# Patient Record
Sex: MALE | Race: WHITE | Employment: OTHER | URBAN - METROPOLITAN AREA
[De-identification: names, ages, dates, MRNs, and addresses within clinical notes are randomized per-mention and may not be internally consistent; named-entity substitution may affect disease eponyms.]

---

## 2023-02-17 DIAGNOSIS — M25.551 RIGHT HIP PAIN: Primary | ICD-10-CM

## 2023-02-21 ENCOUNTER — OFFICE VISIT (OUTPATIENT)
Dept: ORTHOPEDIC SURGERY | Age: 68
End: 2023-02-21

## 2023-02-21 VITALS — BODY MASS INDEX: 32.25 KG/M2 | WEIGHT: 212.8 LBS | HEIGHT: 68 IN

## 2023-02-21 DIAGNOSIS — M25.561 PAIN IN BOTH KNEES, UNSPECIFIED CHRONICITY: ICD-10-CM

## 2023-02-21 DIAGNOSIS — M25.551 RIGHT HIP PAIN: Primary | ICD-10-CM

## 2023-02-21 DIAGNOSIS — M25.562 PAIN IN BOTH KNEES, UNSPECIFIED CHRONICITY: ICD-10-CM

## 2023-02-21 NOTE — PROGRESS NOTES
Patient ID:  Roland Barrow  [id number]  26 y.o.  1955    Today: February 21, 2023          Chief Complaint: Bilateral Knee pain    HPI:       Roland Barrow is a 74 y.o. male  that presents today for evaluation and treatment of bilateral knee pain. Patient reports onset of pain some time ago ago. The patient complains of knee pain with activities, reports stiffness of the knee with prolonged inactivity, and swelling/pain at the end of the day and after increased physical activity. Pain is described as a general ache with occasional sharp pain, primarily along the joint lines. They rate the pain as a fluctuating between 3-7. Generally, symptoms improve with sitting/rest.  The pain affects the patients activities of daily living and quality of life. Patient reports progressive pain and instability in the knee. Prior procedures on the knee include none. Patient has attempted prior conservative treatment including Over-the-Counter medications including NSAID and/or Tylenol and Prescription NSAID. They have had success with prior treatments. Prior Prescription NSAID has provided great success although not complete relief. Past Medical History:  No past medical history on file. Past Surgical History:  No past surgical history on file. Medications:     Prior to Admission medications    Not on File       Family History:   No family history on file. Social History:      Social History     Tobacco Use    Smoking status: Not on file    Smokeless tobacco: Not on file   Substance Use Topics    Alcohol use: Not on file         Allergies:    Not on File     Vitals:   Ht 5' 8\" (1.727 m)   Wt 212 lb 12.8 oz (96.5 kg)   BMI 32.36 kg/m²     ROS:  Patient Health Form has been filled out, reviewed, signed and included in the chart. ROS findings related to musculoskeletal problems were discussed with the patient.   Patient advised to discuss non-orthopedic complaints with primary care physician. Objective:     Vitals:   Ht 5' 8\" (1.727 m)   Wt 212 lb 12.8 oz (96.5 kg)   BMI 32.36 kg/m²     General: Awake and alert. AAOx3. Psych: Mood appropriate  HEENT: Normocephalic, atraumatic  Neck: Supple  CV/Pulm: Breathing even and unlabored  Abdomen: Nondistended  Circulation: Normal without obvious arterial or venous deficiency. Pulses palpable bilateral lower extremities. Lymphatic: No obvious lymphedema or lymphadenopathy noted. Skin: No obvious lacerations or rashes noted. Musculoskeletal: No obvious deformity or pain with active movement of the upper extremities. Neuro: No obvious deficiency or weakness noted of the upper or lower extremities    Knee Exam:  There is pain with ROM of both knees. Range of motion on the right is 0-130. Range of motion on the left is 0-130. Trace effusion noted in the knees. Patellofemoral crepitus is present. Tenderness along the joint line both at rest and during motion. There is no significant ligamentous laxity. Distally the patient shows no neurologic deficit. Sensation is grossly intact. The patient is able to grossly plantar- and dorsi-flex the involved foot/ankle. Imaging (obtained today or previously):    Heading: XR Knee 3-4 View  Views: AP knee, skiers PA, lateral knee, sunrise view bilateral knee  Clinical indication: Bilateral Knee Pain   Findings: Xrays of the knees obtained today or previously show tricompartmental bone-on-bone arthritic changes of the knees with associated osteophyte formation and subchondral sclerosis. Impression: Bilateral Knee osteoarthritis    XR HIP RT W OR WO PELVIS 2-3 VWS  Views Obtained: AP pelvis, lateral right hip  Indication: Right Hip Pain  Findings:  Xrays including A/P Pelvis and lateral of the hip(s) were reviewed which demonstrate no evidence of significant osteoarthritis. The joint space appears well maintained and there is no significant osteophyte formation or presence of subchondral cysts.  No acute fracture/process is appreciated. Impression: Normal appearing right hip    Aroldo Mejia MD        Assessment:   1. Bilateral Knee Arthritis    Plan:  Treatment option have been discussed with the patient. The patient understands the nature of knee arthritis and that this is generally a progressive condition. We discussed oral anti-inflammatory medications, activity modifications, physical therapy, corticosteroid injections, weight loss (if appropriate), and surgery. We discussed that given the degenerative nature of the joint that in most cases surgery is the definitive treatment for this condition. We did discuss some of the details of surgery along with some of the risks, benefits and alternatives. At this point the patient is a candidate for surgery secondary to the patients desire to exhaust conservative treatment options and delay surgery. . At this point the patient has failed the aforementioned treatment modalities and would like to proceed with Oral Non-steroidal anti-inflammatory medication    Treatment:    Self-Administered Conservative Care-patient would like to continue prior treatment regimens including PRN OTC medications and self directed activity modifications, stretching and strengthening.  If the patient feels at some point that this is no longer effective patient can return to office and we can discuss further treatment options        Signed By: Aroldo Mejia MD  February 21, 2023